# Patient Record
Sex: MALE | Employment: UNEMPLOYED | URBAN - METROPOLITAN AREA
[De-identification: names, ages, dates, MRNs, and addresses within clinical notes are randomized per-mention and may not be internally consistent; named-entity substitution may affect disease eponyms.]

---

## 2022-09-01 NOTE — TELEPHONE ENCOUNTER
FUTURE VISIT INFORMATION      FUTURE VISIT INFORMATION:    Date: 9/21/22    Time: 3:20PM    Location: [facility]  REFERRAL INFORMATION:    Referring provider:      Referring providers clinic:      Reason for visit/diagnosis  Gilman Palsy Consult    RECORDS REQUESTED FROM:       Clinic name Comments Records Status Imaging Status   [facility]  7/15/22 note from Sushant Rubio DO   Care everywhere    [facility] Emergency Department   6/25/22 ED note from Jewels Sanders MD   Care everywhere

## 2022-09-21 ENCOUNTER — OFFICE VISIT (OUTPATIENT)
Dept: OTOLARYNGOLOGY | Facility: CLINIC | Age: 52
End: 2022-09-21
Payer: COMMERCIAL

## 2022-09-21 ENCOUNTER — PRE VISIT (OUTPATIENT)
Dept: OTOLARYNGOLOGY | Facility: CLINIC | Age: 52
End: 2022-09-21

## 2022-09-21 VITALS
SYSTOLIC BLOOD PRESSURE: 144 MMHG | BODY MASS INDEX: 27.85 KG/M2 | HEIGHT: 73 IN | WEIGHT: 210.1 LBS | DIASTOLIC BLOOD PRESSURE: 99 MMHG | HEART RATE: 94 BPM | OXYGEN SATURATION: 98 %

## 2022-09-21 DIAGNOSIS — G51.0 FACIAL PARALYSIS ON LEFT SIDE: ICD-10-CM

## 2022-09-21 DIAGNOSIS — G51.9 SEVENTH CRANIAL NERVE DISORDER: Primary | ICD-10-CM

## 2022-09-21 DIAGNOSIS — R13.11 ORAL PHASE DYSPHAGIA: ICD-10-CM

## 2022-09-21 DIAGNOSIS — R47.1 DYSARTHRIA: ICD-10-CM

## 2022-09-21 PROCEDURE — 99204 OFFICE O/P NEW MOD 45 MIN: CPT | Performed by: OTOLARYNGOLOGY

## 2022-09-21 RX ORDER — GLYCERIN .002; .002; .01 MG/MG; MG/MG; MG/MG
SOLUTION/ DROPS OPHTHALMIC
COMMUNITY
Start: 2022-06-26

## 2022-09-21 RX ORDER — CHLORAL HYDRATE 500 MG
2 CAPSULE ORAL DAILY
COMMUNITY

## 2022-09-21 RX ORDER — MULTIVITAMIN WITH IRON
TABLET ORAL DAILY
COMMUNITY

## 2022-09-21 ASSESSMENT — PAIN SCALES - GENERAL: PAINLEVEL: NO PAIN (0)

## 2022-09-21 NOTE — PROGRESS NOTES
"Facial Plastic and Reconstructive Surgery Consultation    Referring Provider:   Referred Self, MD  No address on file     HPI:   I had the pleasure of seeing Mahad Shelby today in clinic for consultation for left facial paralysis. Mahad Shelby is a 52 year old male who experienced left sided acute onset facial paralysis in June of 2022. All areas of the face were affected. He presented to an outside ED and was treated with prednisone and valacyclovir for a presentation consistent with Bell's palsy. They started him on doxycycline but did have a Lyme titer which was negative.      He presents today with no facial movement. He uses lacrilube in his eye, and he has stopped taping his eye at night. He has not had any imaging. He describes a brain fog and that his \"head doesn't feel right\"- He also describes that he had a lot a pain prior to the onset of the paralysis. He denies hearing loss and vertigo.      Review Of Systems  ROS: 10 point ROS neg other than the symptoms noted above in the HPI.    There is no problem list on file for this patient.    No past surgical history on file.  Current Outpatient Medications   Medication Sig Dispense Refill     ARTIFICIAL TEARS 0.2-0.2-1 % SOLN ophthalmic solution PLACE 2 DROPS INTO THE LEFT EYE EVERY 2 HOURS AS NEEDED FOR DRY EYES       fish oil-omega-3 fatty acids 1000 MG capsule Take 2 g by mouth daily       magnesium 250 MG tablet Take by mouth daily       vitamin B-12 (CYANOCOBALAMIN) 100 MCG tablet Take 1,000 mcg by mouth daily       Penicillins  Social History     Socioeconomic History     Marital status:      Spouse name: Not on file     Number of children: Not on file     Years of education: Not on file     Highest education level: Not on file   Occupational History     Not on file   Tobacco Use     Smoking status: Not on file     Smokeless tobacco: Not on file   Substance and Sexual Activity     Alcohol use: Not on file     Drug use: Not on file "     Sexual activity: Not on file   Other Topics Concern     Not on file   Social History Narrative     Not on file     Social Determinants of Health     Financial Resource Strain: Not on file   Food Insecurity: Not on file   Transportation Needs: Not on file   Physical Activity: Not on file   Stress: Not on file   Social Connections: Not on file   Intimate Partner Violence: Not on file   Housing Stability: Not on file     No family history on file.    PE:  Alert and Oriented, Answering Questions Appropriately  Atraumatic, Normocephalic, Face asymmetric with left complete facial paralysis  Skin: Howell 2  Facial Nerve Intact on right, left facial droop, incomplete eye closure  EOM's, PEERL  Chest: No wheezing, cyanosis, or stridor  Card: Normal upper extremity pulses and capillary refill, not diaphoretic  Neuro/Psych: CN's 2-12 intact except for left facial paralysis, Moves all extremities, ambulation in intact, positive affect, no notable muscle weakness            IMPRESSION/PLAN:    Left idiopathic facial paralysis, constellation of symptoms suggestive of Herpes zoster- this would then portend to a slower recovery compared to Bell;s palsy.    Since he does not have any movement at 3 months post the onset of paralysis, we will obtain MRI imaging.     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 We discussed this today.       Photodocumentation was obtained.     I spent a total of 45 minutes in the care of patient Mahad Shelby during today's office visit. This time includes reviewing the patient's chart and prior history, obtaining a history, performing an examination and evaluation and counseling the patient. This time also includes ordering mediations or tests necessary in addition to communication to other member's of the patient's health care team. Time spent in documentation and care coordination is included.

## 2022-09-21 NOTE — LETTER
Date:September 22, 2022      Patient was self referred, no letter generated. Do not send.         Health Information

## 2022-09-21 NOTE — LETTER
"9/21/2022       RE: Mahad Shelby  [address]  [address]  [place] [address]     Dear Colleague,    Thank you for referring your patient, Mahad Shelby, to the [facility] EAR NOSE AND THROAT CLINIC [place] [facility]. Please see a copy of my visit note below.    Facial Plastic and Reconstructive Surgery Consultation    Referring Provider:   Referred Self, MD  No address on file     HPI:   I had the pleasure of seeing Mahad Shelby today in clinic for consultation for left facial paralysis. Mahad Shelby is a 52 year old male who experienced left sided acute onset facial paralysis in June of 2022. All areas of the face were affected. He presented to an outside ED and was treated with prednisone and valacyclovir for a presentation consistent with Bell's palsy. They started him on doxycycline but did have a Lyme titer which was negative.      He presents today with no facial movement. He uses lacrilube in his eye, and he has stopped taping his eye at night. He has not had any imaging. He describes a brain fog and that his \"head doesn't feel right\"- He also describes that he had a lot a pain prior to the onset of the paralysis. He denies hearing loss and vertigo.      Review Of Systems  ROS: 10 point ROS neg other than the symptoms noted above in the HPI.    There is no problem list on file for this patient.    No past surgical history on file.  Current Outpatient Medications   Medication Sig Dispense Refill     ARTIFICIAL TEARS 0.2-0.2-1 % SOLN ophthalmic solution PLACE 2 DROPS INTO THE LEFT EYE EVERY 2 HOURS AS NEEDED FOR DRY EYES       fish oil-omega-3 fatty acids 1000 MG capsule Take 2 g by mouth daily       magnesium 250 MG tablet Take by mouth daily       vitamin B-12 (CYANOCOBALAMIN) 100 MCG tablet Take 1,000 mcg by mouth daily       Penicillins  Social History     Socioeconomic History     Marital status:      Spouse name: Not " on file     Number of children: Not on file     Years of education: Not on file     Highest education level: Not on file   Occupational History     Not on file   Tobacco Use     Smoking status: Not on file     Smokeless tobacco: Not on file   Substance and Sexual Activity     Alcohol use: Not on file     Drug use: Not on file     Sexual activity: Not on file   Other Topics Concern     Not on file   Social History Narrative     Not on file     Social Determinants of Health     Financial Resource Strain: Not on file   Food Insecurity: Not on file   Transportation Needs: Not on file   Physical Activity: Not on file   Stress: Not on file   Social Connections: Not on file   Intimate Partner Violence: Not on file   Housing Stability: Not on file     No family history on file.    PE:  Alert and Oriented, Answering Questions Appropriately  Atraumatic, Normocephalic, Face asymmetric with left complete facial paralysis  Skin: Howell 2  Facial Nerve Intact on right, left facial droop, incomplete eye closure  EOM's, PEERL  Chest: No wheezing, cyanosis, or stridor  Card: Normal upper extremity pulses and capillary refill, not diaphoretic  Neuro/Psych: CN's 2-12 intact except for left facial paralysis, Moves all extremities, ambulation in intact, positive affect, no notable muscle weakness            IMPRESSION/PLAN:    Left idiopathic facial paralysis, constellation of symptoms suggestive of Herpes zoster- this would then portend to a slower recovery compared to Bell;s palsy.    Since he does not have any movement at 3 months post the onset of paralysis, we will obtain MRI imaging.     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 We discussed this today.       Photodocumentation was obtained.     I spent a total of 45 minutes in the care of patient Mahad FUNES [place]leigh during today's office visit. This time includes reviewing the patient's chart and prior history, obtaining  a history, performing an examination and evaluation and counseling the patient. This time also includes ordering mediations or tests necessary in addition to communication to other member's of the patient's health care team. Time spent in documentation and care coordination is included.             Again, thank you for allowing me to participate in the care of your patient.      Sincerely,    Teri Wynn MD

## 2022-09-26 DIAGNOSIS — G51.9 SEVENTH CRANIAL NERVE DISORDER: ICD-10-CM

## 2022-09-26 DIAGNOSIS — H02.239 PARALYTIC LAGOPHTHALMOS: Primary | ICD-10-CM

## 2022-09-26 RX ORDER — CLINDAMYCIN PHOSPHATE 900 MG/50ML
900 INJECTION, SOLUTION INTRAVENOUS
Status: CANCELLED | OUTPATIENT
Start: 2022-09-26

## 2022-09-26 RX ORDER — CLINDAMYCIN PHOSPHATE 900 MG/50ML
900 INJECTION, SOLUTION INTRAVENOUS SEE ADMIN INSTRUCTIONS
Status: CANCELLED | OUTPATIENT
Start: 2022-09-26

## 2022-09-26 NOTE — NURSING NOTE
Photo and video documentation obtained.    Will work to obtain PA for left upper eyelid weight.    Referral placed for facial rehab with Trice Modi.    MR Brain and Face ordered.  Message sent to clinic coordinators to assist with scheduling.    Randi Calvillo RN  9/26/2022 3:36 PM

## 2022-09-27 ENCOUNTER — TELEPHONE (OUTPATIENT)
Dept: OTOLARYNGOLOGY | Facility: CLINIC | Age: 52
End: 2022-09-27

## 2022-09-27 PROBLEM — G51.9 SEVENTH CRANIAL NERVE DISORDER: Status: ACTIVE | Noted: 2022-09-27

## 2022-09-27 PROBLEM — H02.239 PARALYTIC LAGOPHTHALMOS: Status: ACTIVE | Noted: 2022-09-27

## 2022-09-29 ENCOUNTER — DOCUMENTATION ONLY (OUTPATIENT)
Dept: OTOLARYNGOLOGY | Facility: CLINIC | Age: 52
End: 2022-09-29

## 2022-09-29 NOTE — PROGRESS NOTES
MR orders faxed to [facility], per pt request.  Fax #[phone]    Randi Calvillo RN  9/29/2022 3:48 PM

## 2022-10-03 ENCOUNTER — TRANSFERRED RECORDS (OUTPATIENT)
Dept: HEALTH INFORMATION MANAGEMENT | Facility: CLINIC | Age: 52
End: 2022-10-03

## 2022-10-04 ENCOUNTER — TELEPHONE (OUTPATIENT)
Dept: OTOLARYNGOLOGY | Facility: CLINIC | Age: 52
End: 2022-10-04

## 2022-10-04 NOTE — TELEPHONE ENCOUNTER
Records Requested  10/04/22    Facility  [facility]  Fax: [phone]   Outcome * 10/4/22 10:25 AM Faxed req to [facility] for images to be pushed to [employer] [facility]. - Rolanda    10/3/22 - MRI Brain    10/5/22 9AM received images in PACS- Casey

## 2022-10-10 ENCOUNTER — MYC MEDICAL ADVICE (OUTPATIENT)
Dept: OTOLARYNGOLOGY | Facility: CLINIC | Age: 52
End: 2022-10-10

## 2022-10-10 NOTE — TELEPHONE ENCOUNTER
Called pt re: MRI results.  Dr. Wynn reviewed the images and did not see any obvious tumor/mass affecting the facial nerve.  She did note that there is inflammation around the facial nerve (also noted on Radiologist's read), which is consistent with his diagnosis of [place] No.    We discussed that we wait 6 months after onset of paralysis to give the facial nerve time to heal and will reevaluate facial movement at the 6 month isaiah.    Message sent to surgery scheduler re: scheduling the eyelid weight.    Randi Calvillo RN  10/10/2022 4:19 PM

## 2022-10-15 ENCOUNTER — TELEPHONE (OUTPATIENT)
Dept: OTOLARYNGOLOGY | Facility: CLINIC | Age: 52
End: 2022-10-15

## 2022-10-15 NOTE — TELEPHONE ENCOUNTER
Called patient to schedule surgery with Dr. Carlos Lo... Patient was offered soonest available     Date of Surgery: 10/27/2022  Approximate arrival time given:  Yes  Location of surgery: [facility]  Pre-Op H&P: [facility]. Asked patient to call back if he has any troubles   Post-Op Appt Date: 1 week virtual    Imaging needed:  No  Discussed COVID-19 Testing: Yes 1-2 days at Home COVID test     Patient aware that pre-op RN will call 2-3 days prior to surgery with arrival time and instructions Yes    Packet sent out: Yes 10/15/22    All patients questions were answered and was instructed to review surgical packet and call back with any questions or concerns.       Aida Gonsalez on 10/15/2022 at 10:49 AM

## 2022-10-26 ENCOUNTER — ANESTHESIA EVENT (OUTPATIENT)
Dept: SURGERY | Facility: AMBULATORY SURGERY CENTER | Age: 52
End: 2022-10-26
Payer: COMMERCIAL

## 2022-10-27 ENCOUNTER — ANESTHESIA (OUTPATIENT)
Dept: SURGERY | Facility: AMBULATORY SURGERY CENTER | Age: 52
End: 2022-10-27
Payer: COMMERCIAL

## 2022-10-27 ENCOUNTER — HOSPITAL ENCOUNTER (OUTPATIENT)
Facility: AMBULATORY SURGERY CENTER | Age: 52
Discharge: HOME | End: 2022-10-27
Attending: OTOLARYNGOLOGY
Payer: COMMERCIAL

## 2022-10-27 VITALS
WEIGHT: 210 LBS | TEMPERATURE: 97.3 F | DIASTOLIC BLOOD PRESSURE: 91 MMHG | SYSTOLIC BLOOD PRESSURE: 128 MMHG | HEIGHT: 73 IN | BODY MASS INDEX: 27.83 KG/M2 | RESPIRATION RATE: 16 BRPM | HEART RATE: 72 BPM | OXYGEN SATURATION: 96 %

## 2022-10-27 DIAGNOSIS — G51.9 SEVENTH CRANIAL NERVE DISORDER: ICD-10-CM

## 2022-10-27 DIAGNOSIS — Z98.890 POST-OPERATIVE STATE: Primary | ICD-10-CM

## 2022-10-27 DIAGNOSIS — H02.239 PARALYTIC LAGOPHTHALMOS: ICD-10-CM

## 2022-10-27 PROCEDURE — 67912 CORRECTION EYELID W/IMPLANT: CPT | Mod: E1

## 2022-10-27 DEVICE — IMPLANTABLE DEVICE: Type: IMPLANTABLE DEVICE | Site: EYE | Status: FUNCTIONAL

## 2022-10-27 RX ORDER — FENTANYL CITRATE 50 UG/ML
25 INJECTION, SOLUTION INTRAMUSCULAR; INTRAVENOUS EVERY 5 MIN PRN
Status: DISCONTINUED | OUTPATIENT
Start: 2022-10-27 | End: 2022-10-27 | Stop reason: HOSPADM

## 2022-10-27 RX ORDER — LIDOCAINE 40 MG/G
CREAM TOPICAL
Status: DISCONTINUED | OUTPATIENT
Start: 2022-10-27 | End: 2022-10-27 | Stop reason: HOSPADM

## 2022-10-27 RX ORDER — TETRACAINE HYDROCHLORIDE 5 MG/ML
SOLUTION OPHTHALMIC PRN
Status: DISCONTINUED | OUTPATIENT
Start: 2022-10-27 | End: 2022-10-27 | Stop reason: HOSPADM

## 2022-10-27 RX ORDER — SODIUM CHLORIDE, SODIUM LACTATE, POTASSIUM CHLORIDE, CALCIUM CHLORIDE 600; 310; 30; 20 MG/100ML; MG/100ML; MG/100ML; MG/100ML
INJECTION, SOLUTION INTRAVENOUS CONTINUOUS
Status: DISCONTINUED | OUTPATIENT
Start: 2022-10-27 | End: 2022-10-27 | Stop reason: HOSPADM

## 2022-10-27 RX ORDER — GABAPENTIN 300 MG/1
300 CAPSULE ORAL
Status: COMPLETED | OUTPATIENT
Start: 2022-10-27 | End: 2022-10-27

## 2022-10-27 RX ORDER — IBUPROFEN 400 MG/1
400 TABLET, FILM COATED ORAL EVERY 6 HOURS PRN
Qty: 30 TABLET | Refills: 0 | Status: SHIPPED | OUTPATIENT
Start: 2022-10-27

## 2022-10-27 RX ORDER — HYDROMORPHONE HYDROCHLORIDE 1 MG/ML
0.2 INJECTION, SOLUTION INTRAMUSCULAR; INTRAVENOUS; SUBCUTANEOUS EVERY 5 MIN PRN
Status: DISCONTINUED | OUTPATIENT
Start: 2022-10-27 | End: 2022-10-27 | Stop reason: HOSPADM

## 2022-10-27 RX ORDER — PROPOFOL 10 MG/ML
INJECTION, EMULSION INTRAVENOUS PRN
Status: DISCONTINUED | OUTPATIENT
Start: 2022-10-27 | End: 2022-10-27

## 2022-10-27 RX ORDER — SODIUM CHLORIDE, SODIUM LACTATE, POTASSIUM CHLORIDE, CALCIUM CHLORIDE 600; 310; 30; 20 MG/100ML; MG/100ML; MG/100ML; MG/100ML
INJECTION, SOLUTION INTRAVENOUS CONTINUOUS
Status: DISCONTINUED | OUTPATIENT
Start: 2022-10-27 | End: 2022-10-28 | Stop reason: HOSPADM

## 2022-10-27 RX ORDER — BALANCED SALT SOLUTION 6.4; .75; .48; .3; 3.9; 1.7 MG/ML; MG/ML; MG/ML; MG/ML; MG/ML; MG/ML
SOLUTION OPHTHALMIC PRN
Status: DISCONTINUED | OUTPATIENT
Start: 2022-10-27 | End: 2022-10-27 | Stop reason: HOSPADM

## 2022-10-27 RX ORDER — LIDOCAINE HYDROCHLORIDE AND EPINEPHRINE 10; 10 MG/ML; UG/ML
INJECTION, SOLUTION INFILTRATION; PERINEURAL PRN
Status: DISCONTINUED | OUTPATIENT
Start: 2022-10-27 | End: 2022-10-27 | Stop reason: HOSPADM

## 2022-10-27 RX ORDER — ACETAMINOPHEN 325 MG/1
975 TABLET ORAL ONCE
Status: COMPLETED | OUTPATIENT
Start: 2022-10-27 | End: 2022-10-27

## 2022-10-27 RX ORDER — CLINDAMYCIN PHOSPHATE 900 MG/50ML
900 INJECTION, SOLUTION INTRAVENOUS
Status: COMPLETED | OUTPATIENT
Start: 2022-10-27 | End: 2022-10-27

## 2022-10-27 RX ORDER — ONDANSETRON 4 MG/1
4 TABLET, ORALLY DISINTEGRATING ORAL EVERY 30 MIN PRN
Status: DISCONTINUED | OUTPATIENT
Start: 2022-10-27 | End: 2022-10-28 | Stop reason: HOSPADM

## 2022-10-27 RX ORDER — FENTANYL CITRATE 50 UG/ML
25 INJECTION, SOLUTION INTRAMUSCULAR; INTRAVENOUS
Status: DISCONTINUED | OUTPATIENT
Start: 2022-10-27 | End: 2022-10-28 | Stop reason: HOSPADM

## 2022-10-27 RX ORDER — ACETAMINOPHEN 325 MG/1
325-650 TABLET ORAL EVERY 6 HOURS PRN
Qty: 30 TABLET | Refills: 0 | Status: SHIPPED | OUTPATIENT
Start: 2022-10-27

## 2022-10-27 RX ORDER — OXYCODONE HYDROCHLORIDE 5 MG/1
5 TABLET ORAL EVERY 4 HOURS PRN
Status: DISCONTINUED | OUTPATIENT
Start: 2022-10-27 | End: 2022-10-28 | Stop reason: HOSPADM

## 2022-10-27 RX ORDER — ERYTHROMYCIN 5 MG/G
OINTMENT OPHTHALMIC PRN
Status: DISCONTINUED | OUTPATIENT
Start: 2022-10-27 | End: 2022-10-27 | Stop reason: HOSPADM

## 2022-10-27 RX ORDER — MEPERIDINE HYDROCHLORIDE 25 MG/ML
12.5 INJECTION INTRAMUSCULAR; INTRAVENOUS; SUBCUTANEOUS
Status: DISCONTINUED | OUTPATIENT
Start: 2022-10-27 | End: 2022-10-28 | Stop reason: HOSPADM

## 2022-10-27 RX ORDER — PROPOFOL 10 MG/ML
INJECTION, EMULSION INTRAVENOUS CONTINUOUS PRN
Status: DISCONTINUED | OUTPATIENT
Start: 2022-10-27 | End: 2022-10-27

## 2022-10-27 RX ORDER — LIDOCAINE HYDROCHLORIDE 20 MG/ML
INJECTION, SOLUTION INFILTRATION; PERINEURAL PRN
Status: DISCONTINUED | OUTPATIENT
Start: 2022-10-27 | End: 2022-10-27

## 2022-10-27 RX ORDER — CLINDAMYCIN PHOSPHATE 900 MG/50ML
900 INJECTION, SOLUTION INTRAVENOUS SEE ADMIN INSTRUCTIONS
Status: DISCONTINUED | OUTPATIENT
Start: 2022-10-27 | End: 2022-10-27 | Stop reason: HOSPADM

## 2022-10-27 RX ORDER — ONDANSETRON 2 MG/ML
4 INJECTION INTRAMUSCULAR; INTRAVENOUS EVERY 30 MIN PRN
Status: DISCONTINUED | OUTPATIENT
Start: 2022-10-27 | End: 2022-10-28 | Stop reason: HOSPADM

## 2022-10-27 RX ADMIN — SODIUM CHLORIDE, SODIUM LACTATE, POTASSIUM CHLORIDE, CALCIUM CHLORIDE: 600; 310; 30; 20 INJECTION, SOLUTION INTRAVENOUS at 13:13

## 2022-10-27 RX ADMIN — ACETAMINOPHEN 975 MG: 325 TABLET ORAL at 13:11

## 2022-10-27 RX ADMIN — CLINDAMYCIN PHOSPHATE 900 MG: 900 INJECTION, SOLUTION INTRAVENOUS at 14:06

## 2022-10-27 RX ADMIN — PROPOFOL 50 MG: 10 INJECTION, EMULSION INTRAVENOUS at 14:11

## 2022-10-27 RX ADMIN — GABAPENTIN 300 MG: 300 CAPSULE ORAL at 13:11

## 2022-10-27 RX ADMIN — LIDOCAINE HYDROCHLORIDE 80 MG: 20 INJECTION, SOLUTION INFILTRATION; PERINEURAL at 14:09

## 2022-10-27 RX ADMIN — PROPOFOL 150 MCG/KG/MIN: 10 INJECTION, EMULSION INTRAVENOUS at 14:09

## 2022-10-27 NOTE — ANESTHESIA CARE TRANSFER NOTE
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    Anesthesia Type:   MAC     Note:    Oropharynx: oropharynx clear of all foreign objects and spontaneously breathing  Level of Consciousness: drowsy  Oxygen Supplementation: room air    Independent Airway: airway patency satisfactory and stable  Dentition: dentition unchanged  Vital Signs Stable: post-procedure vital signs reviewed and stable  Report to RN Given: handoff report given  Patient transferred to: Phase II    Handoff Report: Identifed the Patient, Identified the Reponsible Provider, Reviewed the pertinent medical history, Discussed the surgical course, Reviewed Intra-OP anesthesia mangement and issues during anesthesia, Set expectations for post-procedure period and Allowed opportunity for questions and acknowledgement of understanding      Vitals:  Vitals Value Taken Time   /80 10/27/22 1436   Temp 36.6  C (97.9  F) 10/27/22 1436   Pulse 86 10/27/22 1436   Resp 16 10/27/22 1436   SpO2 95 % 10/27/22 1436       Electronically Signed By: ANNELIESE George CRNA  October 27, 2022  2:47 PM

## 2022-10-27 NOTE — DISCHARGE INSTRUCTIONS
Roll Erythromycin ointment over incision with clean Qtip one a day, make sure incision is covered with ointment.  Ice to incision as needed for pain.    [facility]  Home Care Following Anesthesia  For 24 hours after surgery:  Get plenty of rest.  A responsible adult must stay with you for at least 24 hours after you leave the surgery center.  Do not drive or use heavy equipment.  If you have weakness or tingling, don't drive or use heavy equipment until this feeling goes away.   Do not drink alcohol.   Avoid strenuous or risky activities.  Ask for help when climbing stairs.  You may feel lightheaded.  IF so, sit for a few minutes before standing.  Have someone help you get up.   If you have nausea (feel sick to your stomach): Drink only clear liquids such as apple juice, ginger ale, broth or 7-Up.  Rest may also help.  Be sure to drink enough fluids.  Move to a regular diet as you feel able.   You may have a slight fever.  Call the doctor if your fever is over 100 F (37.7 C) (taken under the tongue) or lasts longer than 24 hours.  You may have a dry mouth, a sore throat, muscle aches or trouble sleeping. These should go away after 24 hours.  Do not make important or legal decisions.   It is recommended to avoid smoking.               Tips for taking pain medications  To get the best pain relief possible, remember these points:  Take pain medications as directed, before pain becomes severe.  Pain medication can upset your stomach: taking it with food may help.  Constipation is a common side effect of pain medication. Drink plenty of  fluids.  Eat foods high in fiber. Take a stool softener if recommended by your doctor or pharmacist.  Do not drink alcohol, drive or operate machinery while taking pain medications.  Ask about other ways to control pain, such as with heat, ice or relaxation.    Tylenol/Acetaminophen Consumption  To help encourage the safe use of acetaminophen, the  makers of TYLENOL  have lowered the maximum daily dose for single-ingredient Extra Strength TYLENOL  (acetaminophen) products sold in the [place] from 8 pills per day (4,000 mg) to 6 pills per day (3,000 mg). The dosing interval has also changed from 2 pills every 4-6 hours to 2 pills every 6 hours.  If you feel your pain relief is insufficient, you may take Tylenol/Acetaminophen in addition to your narcotic pain medication.   Be careful not to exceed 3,000 mg of Tylenol/Acetaminophen in a 24 hour period from all sources.  If you are taking extra strength Tylenol/acetaminophen (500 mg), the maximum dose is 6 tablets in 24 hours.  If you are taking regular strength acetaminophen (325 mg), the maximum dose is 9 tablets in 24 hours.    Call a doctor for any of the following:  Signs of infection (fever, growing tenderness at the surgery site, a large amount of drainage or bleeding, severe pain, foul-smelling drainage, redness, swelling).  It has been over 8 to 10 hours since surgery and you are still not able to urinate (pass water).  Headache for over 24 hours.  Numbness, tingling or weakness the day after surgery (if you had spinal anesthesia).  Signs of Covid-19 infection (temperature over 100 degrees, shortness of breath, cough, loss of taste/smell, generalized body aches, persistent headache, chills, sore throat, nausea/vomiting/diarrhea)  Your doctor is:  Dr. Teri Wynn, Otolaryngology: [phone]                    Or dial [phone] and ask for the resident on call for:  ENT Otolaryngology  For emergency care, call the:  [facility]:  [phone] (TTY for hearing impaired: [phone])

## 2022-10-27 NOTE — OP NOTE
Operative Report     Date: 10/27/2022     Surgeon: Teri Wynn MD       Procedure:   1. Left eyelid weight     Indications: Mahad Shelby is a patient with left  facial flaccid paralysis with paralytic lagophthalmos, ectropion, and poor tear production putting the cornea at risk.  Given this, it was recommended to undergo the above named procedure.  After thorough discussion of the risks, benefits and alternatives, the patient elected to proceed.     Pre-operative Diagnosis: Facial Paralysis, Paralytic Lagophthalmos, Ectropion     Post-operative Diagnosis: Same    Anesthesia: MAC     Complications: None     EBL:5cc    Implants: 1.2 gm Platinum eyelid chain     Description of procedure:     The patient was met in the preoperative holding area and identified by name and date of birth.  The patient was then brought into the operating room and placed in the supine position. IV sedation was administered. The table was turned 90 degrees and the patient was then prepped and draped in the usual sterile fashion and a full timeout was performed.  Local anesthetic was injected 1 ml of 1%  lidocaine with 1:100,000 epinephrine and tetracaine drops were placed in the left eye.      Attention was first turned to the eyelid weight.  A 6-0 silk suture was placed through the gray line of the upper eyelid and this was used to retract the lid inferiorly to protect the eye.  A 1cm incision was made in a natural eyelid crease just superior to the tarsal plate with a 15 blade through the skin.  Hemostasis was achieved using high temp cautery.  A Gary was then used to dissect a plane inferiorly over the tarsal plate and a pocket was made for the eyelid weight.  The  platinum weight chain was then introduced into this pocket and centered just medial to the mid pupillary line.  The chain was noted to be in good position.  The incision was then closed with 5-0 vicryl sutures in a interrupted fashion to reapproximate the  obicularis and the skin was then closed using a 6-0 fast absorbing suture in a simple running fashion.    Erythromycin ophthalmic ointment was applied, the patient was cleaned, and turned over to anesthesia.  The patient emerged from MAC anesthesia in stable condition and was transferred to the PACU for further monitoring.    I was present, scrubbed for the entire procedure and performed key aspects. I agree with the note.      NEGRITA PEREZ MD

## 2022-11-02 ENCOUNTER — VIRTUAL VISIT (OUTPATIENT)
Dept: OTOLARYNGOLOGY | Facility: CLINIC | Age: 52
End: 2022-11-02
Payer: COMMERCIAL

## 2022-11-02 VITALS — WEIGHT: 210 LBS | HEIGHT: 73 IN | BODY MASS INDEX: 27.83 KG/M2

## 2022-11-02 DIAGNOSIS — Z98.890 POSTOPERATIVE STATE: Primary | ICD-10-CM

## 2022-11-02 PROCEDURE — 99024 POSTOP FOLLOW-UP VISIT: CPT | Performed by: OTOLARYNGOLOGY

## 2022-11-02 ASSESSMENT — PAIN SCALES - GENERAL: PAINLEVEL: NO PAIN (0)

## 2022-11-02 NOTE — LETTER
11/2/2022       RE: Mahad Shelby  [address]  [address]  [place] [address]     Dear Colleague,    Thank you for referring your patient, Mahad Shelby, to the [facility] E[facility] NOSE AND TH[facility]AT CLINIC [place] [facility]. Please see a copy of my visit note below.    Facial Plastic and Reconstructive Surgery      Mahad Shelby is doing well.  We had a virtual visit for his post operative appointment. He has full excursion of his lid with good eye closure and his blink is much more effective. Lid margin in also in a good position.    I will see him back in three months.       Again, thank you for allowing me to participate in the care of your patient.      Sincerely,    Teri Wynn MD

## 2022-11-02 NOTE — LETTER
Date:November 3, 2022      Patient was self referred, no letter generated. Do not send.        [facility] Health Information

## 2022-11-02 NOTE — PROGRESS NOTES
Facial Plastic and Reconstructive Surgery      Mahad FUNES Chuckynataliiakerri is doing well.  We had a virtual visit for his post operative appointment. He has full excursion of his lid with good eye closure and his blink is much more effective. Lid margin in also in a good position.    I will see him back in three months.

## 2023-02-12 ENCOUNTER — HEALTH MAINTENANCE LETTER (OUTPATIENT)
Age: 53
End: 2023-02-12

## 2024-03-10 ENCOUNTER — HEALTH MAINTENANCE LETTER (OUTPATIENT)
Age: 54
End: 2024-03-10

## 2025-03-16 ENCOUNTER — HEALTH MAINTENANCE LETTER (OUTPATIENT)
Age: 55
End: 2025-03-16

## (undated) DEVICE — SU SILK 6-0 G-1DA 18" 780G

## (undated) DEVICE — SU PDS II 5-0 RB-2DA 30" Z148H

## (undated) DEVICE — SYR 03ML LL W/O NDL 309657

## (undated) DEVICE — DRSG STERI STRIP 1/4X4" R1546

## (undated) DEVICE — ESU EYE HIGH TEMP 65410-183

## (undated) DEVICE — EYE PREP BETADINE 5% SOLUTION 30ML 0065-0411-30

## (undated) DEVICE — GLOVE PROTEXIS MICRO 7.0  2D73PM70

## (undated) DEVICE — PEN MARKING SKIN TYCO DEVON DUAL TIP 31145868

## (undated) DEVICE — GLOVE PROTEXIS BLUE W/NEU-THERA 7.5  2D73EB75

## (undated) DEVICE — SU PLAIN 6-0 G-1DA 18" 770G

## (undated) DEVICE — SOL WATER IRRIG 500ML BOTTLE 2F7113

## (undated) DEVICE — SU VICRYL 5-0 P-3 18" UND J493G

## (undated) DEVICE — LINEN TOWEL PACK X5 5464

## (undated) DEVICE — BLADE KNIFE SURG 15 371115

## (undated) DEVICE — PACK MINOR EYE CUSTOM ASC

## (undated) DEVICE — NDL 30GA 0.5" 305106

## (undated) RX ORDER — GABAPENTIN 300 MG/1
CAPSULE ORAL
Status: DISPENSED
Start: 2022-10-27

## (undated) RX ORDER — CLINDAMYCIN PHOSPHATE 900 MG/50ML
INJECTION, SOLUTION INTRAVENOUS
Status: DISPENSED
Start: 2022-10-27

## (undated) RX ORDER — BALANCED SALT SOLUTION 6.4; .75; .48; .3; 3.9; 1.7 MG/ML; MG/ML; MG/ML; MG/ML; MG/ML; MG/ML
SOLUTION OPHTHALMIC
Status: DISPENSED
Start: 2022-10-27

## (undated) RX ORDER — LIDOCAINE HYDROCHLORIDE AND EPINEPHRINE 10; 10 MG/ML; UG/ML
INJECTION, SOLUTION INFILTRATION; PERINEURAL
Status: DISPENSED
Start: 2022-10-27

## (undated) RX ORDER — ACETAMINOPHEN 325 MG/1
TABLET ORAL
Status: DISPENSED
Start: 2022-10-27

## (undated) RX ORDER — TETRACAINE HYDROCHLORIDE 5 MG/ML
SOLUTION OPHTHALMIC
Status: DISPENSED
Start: 2022-10-27

## (undated) RX ORDER — ERYTHROMYCIN 5 MG/G
OINTMENT OPHTHALMIC
Status: DISPENSED
Start: 2022-10-27